# Patient Record
Sex: FEMALE | Race: WHITE
[De-identification: names, ages, dates, MRNs, and addresses within clinical notes are randomized per-mention and may not be internally consistent; named-entity substitution may affect disease eponyms.]

---

## 2020-07-11 ENCOUNTER — HOSPITAL ENCOUNTER (INPATIENT)
Dept: HOSPITAL 92 - L&D/OP | Age: 30
LOS: 2 days | Discharge: HOME | End: 2020-07-13
Attending: OBSTETRICS & GYNECOLOGY | Admitting: OBSTETRICS & GYNECOLOGY
Payer: COMMERCIAL

## 2020-07-11 VITALS — BODY MASS INDEX: 31.7 KG/M2

## 2020-07-11 DIAGNOSIS — Z86.718: ICD-10-CM

## 2020-07-11 DIAGNOSIS — Z3A.38: ICD-10-CM

## 2020-07-11 LAB
HBSAG INDEX: 0.18 S/CO (ref 0–0.99)
HGB BLD-MCNC: 13.3 G/DL (ref 12–16)
MCH RBC QN AUTO: 34.5 PG (ref 27–31)
MCV RBC AUTO: 97.6 FL (ref 78–98)
PLATELET # BLD AUTO: 142 THOU/UL (ref 130–400)
RBC # BLD AUTO: 3.86 MILL/UL (ref 4.2–5.4)
SYPHILIS ANTIBODY INDEX: 0.02 S/CO
WBC # BLD AUTO: 11.1 THOU/UL (ref 4.8–10.8)

## 2020-07-11 PROCEDURE — 87340 HEPATITIS B SURFACE AG IA: CPT

## 2020-07-11 PROCEDURE — 86850 RBC ANTIBODY SCREEN: CPT

## 2020-07-11 PROCEDURE — 86901 BLOOD TYPING SEROLOGIC RH(D): CPT

## 2020-07-11 PROCEDURE — 86900 BLOOD TYPING SEROLOGIC ABO: CPT

## 2020-07-11 PROCEDURE — 36415 COLL VENOUS BLD VENIPUNCTURE: CPT

## 2020-07-11 PROCEDURE — 86780 TREPONEMA PALLIDUM: CPT

## 2020-07-11 PROCEDURE — 85027 COMPLETE CBC AUTOMATED: CPT

## 2020-07-11 PROCEDURE — 0KQM0ZZ REPAIR PERINEUM MUSCLE, OPEN APPROACH: ICD-10-PCS | Performed by: OBSTETRICS & GYNECOLOGY

## 2020-07-11 RX ADMIN — DOCUSATE CALCIUM SCH MG: 240 CAPSULE, LIQUID FILLED ORAL at 21:32

## 2020-07-11 NOTE — PDOC.LDPN
Labor & Delivery Progress Note





- Subjective


Subjective: comfortable, vaginal pressure





- Objective


Vital signs reviewed and normal: yes


General: NAD


Plan: continue plan of care


-: 


# Term IUP


Pt currently has anterior lip of cervix remaining. She has an epidural and 

comfortable. Endorses only vaginal pressure.


- continue current care





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


- currently has an IVC filter in place


- Per ACOG, anticoagulation is not recommended for or against in the setting of 

a provoked VTE w/o recurrence - essentially both options are acceptable.  

Kennethdakumar's article on VTE prophylaxis in non-orthopedic procedures places this 

patient at a medium risk for VTE due to prior occurrence, pregnancy - it is 

noted ASA has been used by practitioners in the setting of non-orthopedic 

procedures w/o significant evidence for or against.  Based on this information, 

pt's IVC placement, and discussion with pt we will add  mg, 12 hours 

after delivery to decrease risk of VTE. 





Dispo: continue current plan
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
## 2020-07-12 RX ADMIN — DOCUSATE CALCIUM SCH MG: 240 CAPSULE, LIQUID FILLED ORAL at 08:39

## 2020-07-12 RX ADMIN — DOCUSATE CALCIUM SCH MG: 240 CAPSULE, LIQUID FILLED ORAL at 21:34

## 2020-07-12 RX ADMIN — ASPIRIN SCH MG: 81 TABLET ORAL at 08:39

## 2020-07-12 NOTE — PDOC.PP
Post Partum Progress Note


Post Partum Day #: 1


Subjective: 





Doing well, states no real discomfort


PO intake tolerated: yes


Flatus: yes


Ambulation: yes


 Vital Signs (12 hours)











  Temp Pulse Resp BP Pulse Ox


 


 07/12/20 05:20  98.6 F  84  16  118/64 


 


 07/12/20 00:40  98.6 F  100  16  128/66 


 


 07/11/20 21:00  98.6 F  92  16  112/60  97








 Weight











Weight                         215 lb

















- Physical Examination


General: NAD


Respiratory: non-labored breathing


Abdominal: no distention, appropriately TTP


Extremities: negative homans (B)


Neurological: no gross focal deficits


Psychiatric: A&Ox3, normal affect


Result Diagrams: 


 07/11/20 03:36





Additional Labs: 


 Post Partum Labs











Blood Type  O POSITIVE   07/11/20  04:28    


 


Hep Bs Antigen  Non-Reactive S/CO (NonReactive)   07/11/20  03:36    











(1) Vaginal delivery


Code(s): O80 - ENCOUNTER FOR FULL-TERM UNCOMPLICATED DELIVERY   Status: Acute   





- Assessment/Plan





PPD1 doing well. Likely home tomorrow AM on PPD2

## 2020-07-13 VITALS — SYSTOLIC BLOOD PRESSURE: 134 MMHG | TEMPERATURE: 98.2 F | DIASTOLIC BLOOD PRESSURE: 81 MMHG

## 2020-07-13 RX ADMIN — ASPIRIN SCH MG: 81 TABLET ORAL at 09:52

## 2020-07-13 RX ADMIN — DOCUSATE CALCIUM SCH MG: 240 CAPSULE, LIQUID FILLED ORAL at 09:52

## 2020-07-13 NOTE — PDOC.PP
Post Partum Progress Note


Post Partum Day #: 2


Subjective: 





doing well, pumping and BF, no concerns


PO intake tolerated: yes


Flatus: yes


Ambulation: yes


 Vital Signs (12 hours)











  Pulse Resp BP


 


 07/13/20 00:00  74  16  131/78








 Weight











Weight                         215 lb

















- Physical Examination


General: NAD


Respiratory: non-labored breathing


Fundus firm & at: below umb


Skin: no rash


Neurological: no gross focal deficits


Psychiatric: A&Ox3, normal affect


Result Diagrams: 


 07/11/20 03:36





Additional Labs: 


 Post Partum Labs











Blood Type  O POSITIVE   07/11/20  04:28    


 


Hep Bs Antigen  Non-Reactive S/CO (NonReactive)   07/11/20  03:36    











(1) Vaginal delivery


Code(s): O80 - ENCOUNTER FOR FULL-TERM UNCOMPLICATED DELIVERY   Status: Acute